# Patient Record
Sex: FEMALE | Race: WHITE | NOT HISPANIC OR LATINO | Employment: PART TIME
[De-identification: names, ages, dates, MRNs, and addresses within clinical notes are randomized per-mention and may not be internally consistent; named-entity substitution may affect disease eponyms.]

---
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
## 2024-01-26 ENCOUNTER — TELEPHONE (OUTPATIENT)
Dept: GASTROENTEROLOGY | Age: 19
End: 2024-01-26

## 2024-01-31 ENCOUNTER — APPOINTMENT (OUTPATIENT)
Dept: GASTROENTEROLOGY | Age: 19
End: 2024-01-31
Attending: INTERNAL MEDICINE

## 2024-05-06 ENCOUNTER — HOSPITAL ENCOUNTER (OUTPATIENT)
Age: 19
Discharge: HOME | End: 2024-05-06
Payer: MEDICAID

## 2024-05-06 VITALS
BODY MASS INDEX: 16.2 KG/M2 | HEART RATE: 97 BPM | HEIGHT: 62 IN | WEIGHT: 88 LBS | OXYGEN SATURATION: 99 % | DIASTOLIC BLOOD PRESSURE: 76 MMHG | RESPIRATION RATE: 18 BRPM | TEMPERATURE: 99 F | SYSTOLIC BLOOD PRESSURE: 104 MMHG

## 2024-05-06 DIAGNOSIS — H92.02 LEFT EAR PAIN: Primary | ICD-10-CM

## 2024-05-06 DIAGNOSIS — S00.432A CONTUSION OF AURICLE OF LEFT EAR, INITIAL ENCOUNTER: ICD-10-CM

## 2024-05-06 PROCEDURE — 99203 OFFICE O/P NEW LOW 30 MIN: CPT | Performed by: PHYSICIAN ASSISTANT

## 2024-05-06 NOTE — DISCHARGE INSTRUCTIONS
Resume your antihistamine such as Claritin or Zyrtec  Ibuprofen Tylenol as needed for pain  Follow-up with ENT  Return to the ER if symptoms worsen
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
